# Patient Record
Sex: MALE | Race: WHITE | NOT HISPANIC OR LATINO | Employment: UNEMPLOYED | URBAN - METROPOLITAN AREA
[De-identification: names, ages, dates, MRNs, and addresses within clinical notes are randomized per-mention and may not be internally consistent; named-entity substitution may affect disease eponyms.]

---
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
## 2024-06-05 ENCOUNTER — OFFICE VISIT (OUTPATIENT)
Dept: FAMILY MEDICINE CLINIC | Facility: CLINIC | Age: 19
End: 2024-06-05
Payer: COMMERCIAL

## 2024-06-05 VITALS
OXYGEN SATURATION: 97 % | HEART RATE: 87 BPM | WEIGHT: 225 LBS | SYSTOLIC BLOOD PRESSURE: 110 MMHG | DIASTOLIC BLOOD PRESSURE: 80 MMHG | TEMPERATURE: 97 F | HEIGHT: 67 IN | BODY MASS INDEX: 35.31 KG/M2

## 2024-06-05 DIAGNOSIS — E78.1 HYPERTRIGLYCERIDEMIA: ICD-10-CM

## 2024-06-05 DIAGNOSIS — J45.40 MODERATE PERSISTENT ASTHMA, UNSPECIFIED WHETHER COMPLICATED: ICD-10-CM

## 2024-06-05 DIAGNOSIS — Z00.00 ADULT GENERAL MEDICAL EXAMINATION: Primary | ICD-10-CM

## 2024-06-05 DIAGNOSIS — F84.0 AUTISM: ICD-10-CM

## 2024-06-05 DIAGNOSIS — R74.01 ELEVATED ALT MEASUREMENT: ICD-10-CM

## 2024-06-05 DIAGNOSIS — G47.33 OSA (OBSTRUCTIVE SLEEP APNEA): ICD-10-CM

## 2024-06-05 PROCEDURE — 99385 PREV VISIT NEW AGE 18-39: CPT

## 2024-06-05 RX ORDER — ALBUTEROL SULFATE 90 UG/1
2 AEROSOL, METERED RESPIRATORY (INHALATION) EVERY 6 HOURS PRN
Qty: 18 G | Refills: 5 | Status: SHIPPED | OUTPATIENT
Start: 2024-06-05

## 2024-06-05 NOTE — PROGRESS NOTES
Adult Annual Physical  Name: Tl Kimble      : 2005      MRN: [id number]  Encounter Provider: Sandra Skinner PA-C  Encounter Date: 2024   Encounter department: [facility]    Assessment & Plan   1. Adult general medical examination  Assessment & Plan:  Patient presents today for an annual physical and is establishing with our practice. Patient's medical history and medication list were reviewed and updated. Annual physical questionnaire was given to review current diet, exercise level, sleep health, dental health, visual health, hearing status.    Preventative health needs were reviewed and assessed today:   Immunizations:   Flu - out of season   COVID - no vaccines   Tdap - UTD   HPV - mother will look for records of HPV vaccination     Physical exam today unremarkable, patient is in very good health for his age.  He is very active and eats a proper diet.    He may follow-up with me in 1 year for his next annual physical or sooner with any acute concerns.    2. CRUZ (obstructive sleep apnea)  Assessment & Plan:  Patient unable to obtain CPAP machine from [facility] pulmonary medicine.  Patient already had a home sleep study completed.  Given referral today for sleep medicine specialist as I do think he needs a CPAP machine very soon as he is having daytime fatigue due to his sleep apnea.  Orders:  -     Ambulatory Referral to Sleep Medicine; Future  3. Moderate persistent asthma, unspecified whether complicated  Assessment & Plan:  Asthma is well-controlled with albuterol inhaler as needed, he notes his asthma tends to flareup with exercise.  He also uses a nebulizer as needed.  Refill given today of inhaler as requested.  Patient does have a chronic cough that comes and goes, recommended they add in an oral antihistamine to help with the cough as well.  May consider Singulair in the future if cough continues.  Orders:  -     albuterol (PROVENTIL HFA,VENTOLIN HFA) 90 mcg/act  inhaler; Inhale 2 puffs every 6 (six) hours as needed for wheezing  4. Hypertriglyceridemia  Assessment & Plan:  Patient with history of elevated triglycerides on lipid panels.  Will recheck lipid panel prior to next visit.  Recommend decreasing sugar and fatty food intake.   Orders:  -     Lipid panel; Future  -     Lipid panel  5. Autism  Assessment & Plan:  Patient diagnosed with autism when he was a child.  Patient has the proper support set up at college for him as he still has alcohol to use as a result.  Otherwise, he has good social support and has good social skills on exam today.  6. Elevated ALT measurement  Assessment & Plan:  History of elevated ALT, normalized last December.  Patient was shoulder pain usually when he lifts heavy weights at the gym.  Symptoms consistent with possible rotator cuff injury, will give exercises to complete prior and after exercise event worsening symptoms.  Immunizations and preventive care screenings were discussed with patient today. Appropriate education was printed on patient's after visit summary.    Counseling:  Alcohol/drug use: discussed moderation in alcohol intake, the recommendations for healthy alcohol use, and avoidance of illicit drug use.  Dental Health: discussed importance of regular tooth brushing, flossing, and dental visits.  Injury prevention: discussed safety/seat belts, safety helmets, smoke detectors, carbon dioxide detectors, and smoking near bedding or upholstery.  Sexual health: discussed sexually transmitted diseases, partner selection, use of condoms, avoidance of unintended pregnancy, and contraceptive alternatives.  Exercise: the importance of regular exercise/physical activity was discussed. Recommend exercise 3-5 times per week for at least 30 minutes.          History of Present Illness     Adult Annual Physical:  Patient presents for annual physical. He is establishing care with our practice.  He has no particular concerns today.  He was  "previously being seen by pediatrician..     Diet and Physical Activity:  - Diet/Nutrition: well balanced diet, low fat diet, consuming 3-5 servings of fruits/vegetables daily, adequate fiber intake, adequate whole grain intake and limited junk food.  - Exercise: strength training exercises, moderate cardiovascular exercise, 3-4 times a week on average and 30-60 minutes on average.    Depression Screening:  - PHQ-2 Score: 0    General Health:  - Sleep: witnessed apnea, 7-8 hours of sleep on average, sleeps poorly and daytime hypersomnolence. diagnosed with CRUZ  - Hearing: normal hearing bilateral ears.  - Vision: wears glasses, goes for regular eye exams and most recent eye exam < 1 year ago.  - Dental: regular dental visits and brushes teeth twice daily.     Health:  - History of STDs: no.   - Urinary symptoms: none.     Review of Systems   Constitutional:  Positive for fatigue. Negative for chills and fever.   HENT:  Negative for congestion, sinus pressure, sore throat and trouble swallowing.    Respiratory:  Negative for cough, chest tightness and shortness of breath.    Cardiovascular:  Negative for chest pain, palpitations and leg swelling.   Gastrointestinal:  Negative for abdominal pain, diarrhea, nausea and vomiting.   Genitourinary:  Negative for difficulty urinating, dysuria and hematuria.   Musculoskeletal:  Negative for arthralgias, back pain and myalgias.   Neurological:  Negative for dizziness, seizures, syncope, light-headedness and headaches.   Hematological:  Does not bruise/bleed easily.   Psychiatric/Behavioral:  Positive for sleep disturbance. Negative for behavioral problems and confusion. The patient is not nervous/anxious.    All other systems reviewed and are negative.        Objective     /80 (BP Location: Left arm, Patient Position: Sitting, Cuff Size: Standard)   Pulse 87   Temp (!) 97 °F (36.1 °C)   Ht 5' 7.32\" (1.71 m)   Wt 102 kg (225 lb)   SpO2 97%   BMI 34.90 kg/m² "     Physical Exam  Vitals and nursing note reviewed.   Constitutional:       General: He is not in acute distress.     Appearance: Normal appearance. He is not ill-appearing.   HENT:      Head: Normocephalic and atraumatic.      Right Ear: Tympanic membrane normal.      Left Ear: Tympanic membrane normal.      Nose: Nose normal.      Mouth/Throat:      Mouth: Mucous membranes are moist.      Pharynx: Oropharynx is clear. No oropharyngeal exudate or posterior oropharyngeal erythema.   Eyes:      Extraocular Movements: Extraocular movements intact.      Pupils: Pupils are equal, round, and reactive to light.   Neck:      Vascular: No carotid bruit.   Cardiovascular:      Rate and Rhythm: Normal rate and regular rhythm.      Heart sounds: No murmur heard.  Pulmonary:      Effort: Pulmonary effort is normal. No respiratory distress.      Breath sounds: Normal breath sounds.   Abdominal:      General: Bowel sounds are normal.      Palpations: Abdomen is soft.      Tenderness: There is no abdominal tenderness.   Musculoskeletal:         General: Normal range of motion.      Cervical back: Normal range of motion and neck supple.      Right lower leg: No edema.      Left lower leg: No edema.   Lymphadenopathy:      Cervical: No cervical adenopathy.   Skin:     General: Skin is warm and dry.   Neurological:      General: No focal deficit present.      Mental Status: He is alert and oriented to person, place, and time. Mental status is at baseline.   Psychiatric:         Mood and Affect: Mood normal.         Behavior: Behavior normal.         Judgment: Judgment normal.       Administrative Statements       Sandra Skinner PA-C  [facility]

## 2024-06-05 NOTE — ASSESSMENT & PLAN NOTE
Patient presents today for an annual physical and is establishing with our practice. Patient's medical history and medication list were reviewed and updated. Annual physical questionnaire was given to review current diet, exercise level, sleep health, dental health, visual health, hearing status.    Preventative health needs were reviewed and assessed today:   Immunizations:   Flu - out of season   COVID - no vaccines   Tdap - UTD   HPV - mother will look for records of HPV vaccination     Physical exam today unremarkable, patient is in very good health for his age.  He is very active and eats a proper diet.    He may follow-up with me in 1 year for his next annual physical or sooner with any acute concerns.

## 2024-06-05 NOTE — ASSESSMENT & PLAN NOTE
Patient with history of elevated triglycerides on lipid panels.  Will recheck lipid panel prior to next visit.  Recommend decreasing sugar and fatty food intake.

## 2024-06-05 NOTE — ASSESSMENT & PLAN NOTE
Patient diagnosed with autism when he was a child.  Patient has the proper support set up at college for him as he still has alcohol to use as a result.  Otherwise, he has good social support and has good social skills on exam today.

## 2024-06-05 NOTE — ASSESSMENT & PLAN NOTE
Patient unable to obtain CPAP machine from [facility] pulmonary medicine.  Patient already had a home sleep study completed.  Given referral today for sleep medicine specialist as I do think he needs a CPAP machine very soon as he is having daytime fatigue due to his sleep apnea.

## 2024-06-05 NOTE — ASSESSMENT & PLAN NOTE
Asthma is well-controlled with albuterol inhaler as needed, he notes his asthma tends to flareup with exercise.  He also uses a nebulizer as needed.  Refill given today of inhaler as requested.  Patient does have a chronic cough that comes and goes, recommended they add in an oral antihistamine to help with the cough as well.  May consider Singulair in the future if cough continues.

## 2024-07-05 PROBLEM — Z00.00 ADULT GENERAL MEDICAL EXAMINATION: Status: RESOLVED | Noted: 2024-06-05 | Resolved: 2024-07-05

## 2024-11-27 ENCOUNTER — APPOINTMENT (OUTPATIENT)
Dept: RADIOLOGY | Facility: MEDICAL CENTER | Age: 19
End: 2024-11-27
Payer: COMMERCIAL

## 2024-11-27 ENCOUNTER — OFFICE VISIT (OUTPATIENT)
Dept: URGENT CARE | Facility: MEDICAL CENTER | Age: 19
End: 2024-11-27
Payer: COMMERCIAL

## 2024-11-27 DIAGNOSIS — S69.92XA FINGER INJURY, LEFT, INITIAL ENCOUNTER: Primary | ICD-10-CM

## 2024-11-27 DIAGNOSIS — S69.92XA FINGER INJURY, LEFT, INITIAL ENCOUNTER: ICD-10-CM

## 2024-11-27 DIAGNOSIS — S56.419A RUPTURE OF EXTENSOR TENDON OF FINGER: ICD-10-CM

## 2024-11-27 PROCEDURE — 73140 X-RAY EXAM OF FINGER(S): CPT

## 2024-11-27 PROCEDURE — 99214 OFFICE O/P EST MOD 30 MIN: CPT | Performed by: NURSE PRACTITIONER

## 2024-11-27 PROCEDURE — 29130 APPL FINGER SPLINT STATIC: CPT | Performed by: NURSE PRACTITIONER

## 2024-11-27 NOTE — PROGRESS NOTES
[facility]        NAME: Tl Kimble is a 19 y.o. male  : 2005    MRN: [id number]  DATE: 2024  TIME: 1:47 PM    Assessment and Plan   Finger injury, left, initial encounter [S69.92XA]  1. Finger injury, left, initial encounter  XR finger left second digit-index      2. Rupture of extensor tendon of finger  Ambulatory Referral to Orthopedic Surgery      Xray done in office - images reviewed by myself -   Possible fragment noted on imaging - suspect ligament injury   Unable to extend distal index finger of left hand -   Refer to hand specialist   Splint applied      Patient Instructions     Follow up with PCP in 3-5 days.  Proceed to  ER if symptoms worsen.    Chief Complaint     Chief Complaint   Patient presents with    Finger Injury     Pt presents with left index finger deformity after playing football x 2 weeks ago.  No pain at this time.          History of Present Illness   Tl Kimble presents to the clinic c/o    Pt states 2 weeks ago - was playing in a football game with his college and noticed left index finger pain -   Bruising by the nail   Has shown the  -  recommended soaking the finger and discussed may lose the nail   He is now home for Thanksgiving break and showed his mom - he is unable to straighten his index finger unless he uses his other hand.   Some swelling, mild bruising around nail.  No pus or pain.   Did have pain initially  Football season is over - wrestling season started.        Review of Systems   Review of Systems   All other systems reviewed and are negative.        Current Medications     Long-Term Medications   Medication Sig Dispense Refill    Cholecalciferol 2000 units CAPS Take by mouth         Current Allergies     Allergies as of 2024 - Reviewed 2024   Allergen Reaction Noted    Shellfish-derived products - food allergy Angioedema 2023    Cephalosporins Other (See Comments) 2014    Iodine - food allergy  "Other (See Comments) 12/02/2021    Povidone iodine Other (See Comments) 09/04/2020    Shellfish allergy - food allergy Other (See Comments) 12/02/2021    Cefadroxil Rash 04/16/2015            The following portions of the patient's history were reviewed and updated as appropriate: allergies, current medications, past family history, past medical history, past social history, past surgical history and problem list.    Objective   There were no vitals taken for this visit.       Physical Exam     Physical Exam  Vitals and nursing note reviewed.   Constitutional:       Appearance: Normal appearance. He is well-developed.   HENT:      Head: Normocephalic and atraumatic.   Eyes:      General: Lids are normal.      Conjunctiva/sclera: Conjunctivae normal.      Pupils: Pupils are equal, round, and reactive to light.   Cardiovascular:      Rate and Rhythm: Normal rate and regular rhythm.      Heart sounds: Normal heart sounds, S1 normal and S2 normal.   Pulmonary:      Effort: Pulmonary effort is normal.      Breath sounds: Normal breath sounds.   Musculoskeletal:        Hands:    Skin:     General: Skin is warm and dry.   Neurological:      Mental Status: He is alert.   Psychiatric:         Speech: Speech normal.         Behavior: Behavior normal.         Thought Content: Thought content normal.         Judgment: Judgment normal.           Splint application    Date/Time: 11/27/2024 1:30 PM    Performed by: COSTA Boston  Authorized by: COSTA Boston  Universal Protocol:  procedure performed by consultantConsent: Verbal consent obtained.  Risks and benefits: risks, benefits and alternatives were discussed  Consent given by: patient  Time out: Immediately prior to procedure a \"time out\" was called to verify the correct patient, procedure, equipment, support staff and site/side marked as required.  Timeout called at: 11/27/2024 1:58 PM.  Patient understanding: patient states understanding of the procedure being " performed  Patient consent: the patient's understanding of the procedure matches consent given  Procedure consent: procedure consent matches procedure scheduled  Relevant documents: relevant documents present and verified  Test results: test results available and properly labeled  Site marked: the operative site was marked  Radiology Images displayed and confirmed. If images not available, report reviewed: imaging studies available  Required items: required blood products, implants, devices, and special equipment available  Patient identity confirmed: verbally with patient    Pre-procedure details:     Sensation:  Normal  Procedure details:     Laterality:  Left    Location:  Finger    Finger:  L index finger    Strapping: no      Splint type:  Finger splint, static

## 2024-12-05 DIAGNOSIS — S56.419A RUPTURE OF EXTENSOR TENDON OF FINGER: ICD-10-CM

## 2024-12-05 DIAGNOSIS — M20.012 MALLET FINGER OF LEFT HAND: Primary | ICD-10-CM

## 2024-12-05 PROCEDURE — 99203 OFFICE O/P NEW LOW 30 MIN: CPT | Performed by: SURGERY

## 2024-12-05 NOTE — PATIENT INSTRUCTIONS
Mallet finger treatment  Splint will remain on for 8 weeks  Splint should be kept on at all times even bathing  When removing and/or replacing splint, place your hand on a flat surface such as a table or countertop and slide the splint off. If drying your finger after bathing, dry finger on the flat surface.  Make sure the finger tip does not bend. If it does bend you may have to start the 6 week healing time over.

## 2024-12-05 NOTE — LETTER
December 5, 2024     Patient: Tl Kimble  YOB: 2005  Date of Visit: 12/5/2024      To Whom it May Concern:    Tl Kimble is under my professional care. Tl was seen in my office on 12/5/2024. Tl may wrestle as long as he has his finger splinted.     If you have any questions or concerns, please don't hesitate to call.         Sincerely,          Rakan Grace MD

## 2024-12-05 NOTE — PROGRESS NOTES
ORTHOPAEDIC HAND, WRIST, AND ELBOW OFFICE  VISIT       ASSESSMENT/PLAN:      19 y.o. year old male who presents with left index mallet finger    XR were reviewed and we discussed the findings  Recommend splinting for 8 weeks   He is wrestling for the college and is asking if he needs to splint now  We discussed he may be able to wrestle this season without splinting but if he starts splinting after the season it decreases the changes of obtaining extension.  Staxx splint 5 provided. He will speak to training staff about taping the splint  NSAIDs as needed for discomfort  School note proved  Mallet AVS provided    The patient verbalized understanding of exam findings and treatment plan. We engaged in the shared decision-making process and treatment options were discussed at length with the patient. Surgical and conservative management discussed today along with risks and benefits.    Diagnoses and all orders for this visit:    Mallet finger of left hand    Rupture of extensor tendon of finger  -     Ambulatory Referral to Orthopedic Surgery        Follow Up:  Return in about 2 months (around 2/5/2025) for Recheck.    To Do Next Visit:  Re-evaluation of current issue      General Discussions:  Mallet Finger: The anatomy and physiology of a mallet finger was discussed with the patient today.  Typically, the extensor tendon is torn off of the dorsal aspect of the distal phalanx.  This results in a flexed posture of the distal interphalangeal joint, with incomplete extension (ie. A drooped finger).  Typically this is treated through conservative measures.  An extension block splint is worn over the distal interphalangeal joint for 6-8 weeks continuously, followed by 4-6 weeks of nocturnal use.  After healing, there is typically a small flexion deformity at the distal interphalangeal joint.  Surgery does not typically change these  results.          ____________________________________________________________________________________________________________________________________________      CHIEF COMPLAINT:  Chief Complaint   Patient presents with    Left Hand - Pain, Swelling     Index finger football        SUBJECTIVE:  Tl Kimble is a 19 y.o. year old  male who presents for evaluation of left index finger      Patient states he injured his left index finger approx 4 weeks ago playing football for a local [employer]. He is unsure when it happened but he did have some pain after the game. Saw [employer] staff with a subungal hematoma after the game but did not report any other issues with his finger.. By thanksgiving his mom noticed his finger was deformed and he could not straighten out the end of the finger.  He was seen in the urgent care and his finger was XR and he was proved a long finger splint    I have personally reviewed all the relevant PMH, PSH, SH, FH, Medications and allergies      PAST MEDICAL HISTORY:  Past Medical History:   Diagnosis Date    Autism     Eczema        PAST SURGICAL HISTORY:  No past surgical history on file.    FAMILY HISTORY:  Family History   Problem Relation Age of Onset    Hypertension Mother     Diabetes Mother     Migraines Mother     Clotting disorder Mother     Diabetes Father     Hyperlipidemia Father     Hypertension Father     Anemia Sister     Polycystic ovary syndrome Sister     Depression Sister     Diabetes unspecified Sister     Autism Sister     Asthma Sister     Heart failure Maternal Grandmother     Heart failure Maternal Uncle        SOCIAL HISTORY:  Social History     Tobacco Use    Smoking status: Never    Smokeless tobacco: Never   Vaping Use    Vaping status: Never Used   Substance Use Topics    Alcohol use: Yes     Comment: socially    Drug use: Never       MEDICATIONS:    Current Outpatient Medications:     albuterol (2.5 mg/3 mL) 0.083 % nebulizer solution, Inhale, Disp: , Rfl:      "albuterol (PROVENTIL HFA,VENTOLIN HFA) 90 mcg/act inhaler, Inhale 2 puffs every 6 (six) hours as needed for wheezing, Disp: 18 g, Rfl: 5    Cholecalciferol 2000 units CAPS, Take by mouth, Disp: , Rfl:     PEDIATRIC VITAMINS PO, Take 1 tablet by mouth, Disp: , Rfl:     ALLERGIES:  Allergies   Allergen Reactions    Shellfish-Derived Products - Food Allergy Angioedema    Cephalosporins Other (See Comments)    Iodine - Food Allergy Other (See Comments)     shellfish/iodine    Povidone Iodine Other (See Comments)    Shellfish Allergy - Food Allergy Other (See Comments)    Cefadroxil Rash           REVIEW OF SYSTEMS:  Review of Systems   Constitutional:  Negative for chills and fever.   HENT:  Negative for ear pain and sore throat.    Eyes:  Negative for pain and visual disturbance.   Respiratory:  Negative for cough and shortness of breath.    Cardiovascular:  Negative for chest pain and palpitations.   Gastrointestinal:  Negative for abdominal pain and vomiting.   Genitourinary:  Negative for dysuria and hematuria.   Musculoskeletal:  Negative for arthralgias and back pain.   Skin:  Negative for color change and rash.   Neurological:  Negative for seizures and syncope.   All other systems reviewed and are negative.      VITALS:  There were no vitals filed for this visit.    LABS:  HgA1c:   Lab Results   Component Value Date    HGBA1C 5.0 07/24/2020     BMP: No results found for: \"GLUCOSE\", \"CALCIUM\", \"NA\", \"K\", \"CO2\", \"CL\", \"BUN\", \"CREATININE\"    _____________________________________________________  PHYSICAL EXAMINATION:  General: well developed and well nourished, alert, oriented times 3, and appears comfortable  Psychiatric: Normal  HEENT: Normocephalic, Atraumatic Trachea Midline, No torticollis  Pulmonary: No audible wheezing or respiratory distress   Abdomen/GI: Non tender, non distended   Cardiovascular: No pitting edema, 2+ radial pulse   Skin: No masses, erythema, lacerations, fluctation, " ulcerations  Neurovascular: Sensation Intact to the Median, Ulnar, Radial Nerve, Motor Intact to the Median, Ulnar, Radial Nerve, and Pulses Intact  Musculoskeletal: Normal, except as noted in detailed exam and in HPI.      MUSCULOSKELETAL EXAMINATION:  Right hand:  SILT  Composite fist    Left hand:  SILT  Composite fist    DIP joint of Index finger rests in slight flexion. Unable to extend distal finger  ___________________________________________________  STUDIES REVIEWED:    I have personally reviewed AP lateral and oblique radiographs of left hand 11/27/2024   which demonstrate FINDINGS:     There is a tiny density along the dorsal aspect of the second DIP joint. Underlying bone is intact.     No significant degenerative changes.     No lytic or blastic osseous lesion.     Unremarkable soft tissues.     IMPRESSION:     Tiny density along the dorsal aspect of the second DIP joint of uncertain etiology. While the underlying bone appears intact, a tiny avulsion fragment is not entirely excluded and correlation for point tenderness in this region recommended.         PROCEDURES PERFORMED:  Procedures  No Procedures performed today    _____________________________________________________      Scribe Attestation      I,:  Ever Irizarry am acting as a scribe while in the presence of the attending physician.:       I,:  Rakan Grace MD personally performed the services described in this documentation    as scribed in my presence.:

## 2025-02-18 ENCOUNTER — OFFICE VISIT (OUTPATIENT)
Dept: OBGYN CLINIC | Facility: CLINIC | Age: 20
End: 2025-02-18
Payer: COMMERCIAL

## 2025-02-18 VITALS — WEIGHT: 225 LBS | BODY MASS INDEX: 35.31 KG/M2 | HEIGHT: 67 IN

## 2025-02-18 DIAGNOSIS — M20.012 MALLET FINGER OF LEFT HAND: Primary | ICD-10-CM

## 2025-02-18 PROCEDURE — 99213 OFFICE O/P EST LOW 20 MIN: CPT | Performed by: SURGERY

## 2025-02-18 NOTE — PROGRESS NOTES
HPI:  19M here for follow-up.  He states he has been using the stack splint consistently however it did come off once.  He has some discomfort about the finger but no significant pain.  No associated numbness and tingling.      PE:  Left index finger: Mild swelling of the distal phalanx with subtle extensor lag.  Unable to fully extend at the DIP joint distal phalanx region is nontender.  Sensation intact to light touch radial and ulnar side of the digit.  Good cap refill at the fingertip.  Palpable radial pulse.      A/P:  Left index mallet deformity   Discussed he still has some extensor lag, indicating he needs further consistent DIP joint extension splinting.  Continue full-time splinting without removing it.  Activities as tolerated in the splint.  Follow-up 5-6 weeks for re-evaluation.  All questions answered.

## 2025-02-18 NOTE — LETTER
February 18, 2025     Patient: Tl Kimble  YOB: 2005  Date of Visit: 2/18/2025      To Whom it May Concern:    lT Kimble is under my professional care. Tl was seen in my office on 2/18/2025.     If you have any questions or concerns, please don't hesitate to call.         Sincerely,          Rakan Grace MD        CC: No Recipients

## 2025-03-24 ENCOUNTER — OFFICE VISIT (OUTPATIENT)
Dept: OBGYN CLINIC | Facility: CLINIC | Age: 20
End: 2025-03-24
Payer: COMMERCIAL

## 2025-03-24 DIAGNOSIS — M20.012 MALLET FINGER OF LEFT HAND: Primary | ICD-10-CM

## 2025-03-24 PROCEDURE — 99213 OFFICE O/P EST LOW 20 MIN: CPT | Performed by: SURGERY

## 2025-03-24 NOTE — PROGRESS NOTES
HPI:  19M here for follow-up. He states the finger tip motion looks better. He denies any pain. He has been splint complaint although the dog did get a hold of the splint and chew on it    PE:  Left index finger: Able to hold DIP in neutral.  Sensation intact to light touch radial and ulnar side of the digit . Good cap refill at the fingertip.  Palpable radial pulse.      A/P:  Left index mallet deformity   Assessment & Plan  Mallet finger of left hand  Doing well. Able to hold DIP in neutral  May start gentle flexion of the digit  Should wear splint at night for 2 weeks then wean. New size 5 splint provided  Should tape for 2 weeks during spring football  Activities as tolerated.  NSAIDs as needed  F/U 2 weeks ROM check

## 2025-04-17 ENCOUNTER — OFFICE VISIT (OUTPATIENT)
Age: 20
End: 2025-04-17
Payer: COMMERCIAL

## 2025-04-17 VITALS — HEIGHT: 67 IN | WEIGHT: 225 LBS | BODY MASS INDEX: 35.31 KG/M2

## 2025-04-17 DIAGNOSIS — M20.012 MALLET FINGER OF LEFT HAND: Primary | ICD-10-CM

## 2025-04-17 PROCEDURE — 99213 OFFICE O/P EST LOW 20 MIN: CPT | Performed by: SURGERY

## 2025-04-17 NOTE — PROGRESS NOTES
HPI:  Pt is a 18 yo male with a left index mallet finger.  He was transitioned to night time splinting, but has been using the splint during the day as well.  Denies discomfort.    PE:  LUE:  Index finger:  active flexion and extension with minimal extensor lag, SILT, no edema, no skin breakdown    Assessment & Plan  Mallet finger of left hand     May discontinue splint   Activity as tolerated.   May tape as desired during sports   NSAIDs as needed for any discomfort   F/U PRN.

## 2025-05-29 ENCOUNTER — RA CDI HCC (OUTPATIENT)
Dept: OTHER | Facility: HOSPITAL | Age: 20
End: 2025-05-29